# Patient Record
Sex: FEMALE | Race: WHITE | HISPANIC OR LATINO | Employment: UNEMPLOYED | URBAN - METROPOLITAN AREA
[De-identification: names, ages, dates, MRNs, and addresses within clinical notes are randomized per-mention and may not be internally consistent; named-entity substitution may affect disease eponyms.]

---

## 2021-08-12 ENCOUNTER — APPOINTMENT (OUTPATIENT)
Dept: CT IMAGING | Facility: HOSPITAL | Age: 56
End: 2021-08-12

## 2021-08-12 ENCOUNTER — HOSPITAL ENCOUNTER (EMERGENCY)
Facility: HOSPITAL | Age: 56
Discharge: HOME | End: 2021-08-13
Attending: EMERGENCY MEDICINE | Admitting: EMERGENCY MEDICINE

## 2021-08-12 DIAGNOSIS — D49.0 PAROTID TUMOR: Primary | ICD-10-CM

## 2021-08-12 LAB
ANION GAP SERPL CALCULATED.3IONS-SCNC: 9.4 MMOL/L (ref 5–15)
BASOPHILS # BLD AUTO: 0.04 10*3/MM3 (ref 0–0.2)
BASOPHILS NFR BLD AUTO: 0.4 % (ref 0–1.5)
BUN SERPL-MCNC: 8 MG/DL (ref 6–20)
BUN/CREAT SERPL: 11.3 (ref 7–25)
CALCIUM SPEC-SCNC: 8.8 MG/DL (ref 8.6–10.5)
CHLORIDE SERPL-SCNC: 97 MMOL/L (ref 98–107)
CO2 SERPL-SCNC: 24.6 MMOL/L (ref 22–29)
CREAT SERPL-MCNC: 0.71 MG/DL (ref 0.57–1)
DEPRECATED RDW RBC AUTO: 40.8 FL (ref 37–54)
EOSINOPHIL # BLD AUTO: 0.02 10*3/MM3 (ref 0–0.4)
EOSINOPHIL NFR BLD AUTO: 0.2 % (ref 0.3–6.2)
ERYTHROCYTE [DISTWIDTH] IN BLOOD BY AUTOMATED COUNT: 12.9 % (ref 12.3–15.4)
GFR SERPL CREATININE-BSD FRML MDRD: 85 ML/MIN/1.73
GLUCOSE SERPL-MCNC: 125 MG/DL (ref 65–99)
HCT VFR BLD AUTO: 38.5 % (ref 34–46.6)
HGB BLD-MCNC: 11.9 G/DL (ref 12–15.9)
IMM GRANULOCYTES # BLD AUTO: 0.03 10*3/MM3 (ref 0–0.05)
IMM GRANULOCYTES NFR BLD AUTO: 0.3 % (ref 0–0.5)
LYMPHOCYTES # BLD AUTO: 1.92 10*3/MM3 (ref 0.7–3.1)
LYMPHOCYTES NFR BLD AUTO: 17.3 % (ref 19.6–45.3)
MCH RBC QN AUTO: 26.9 PG (ref 26.6–33)
MCHC RBC AUTO-ENTMCNC: 30.9 G/DL (ref 31.5–35.7)
MCV RBC AUTO: 87.1 FL (ref 79–97)
MONOCYTES # BLD AUTO: 1.24 10*3/MM3 (ref 0.1–0.9)
MONOCYTES NFR BLD AUTO: 11.2 % (ref 5–12)
NEUTROPHILS NFR BLD AUTO: 7.83 10*3/MM3 (ref 1.7–7)
NEUTROPHILS NFR BLD AUTO: 70.6 % (ref 42.7–76)
NRBC BLD AUTO-RTO: 0 /100 WBC (ref 0–0.2)
PLATELET # BLD AUTO: 335 10*3/MM3 (ref 140–450)
PMV BLD AUTO: 10.5 FL (ref 6–12)
POTASSIUM SERPL-SCNC: 4 MMOL/L (ref 3.5–5.2)
RBC # BLD AUTO: 4.42 10*6/MM3 (ref 3.77–5.28)
SODIUM SERPL-SCNC: 131 MMOL/L (ref 136–145)
WBC # BLD AUTO: 11.08 10*3/MM3 (ref 3.4–10.8)

## 2021-08-12 PROCEDURE — 99283 EMERGENCY DEPT VISIT LOW MDM: CPT

## 2021-08-12 PROCEDURE — 70491 CT SOFT TISSUE NECK W/DYE: CPT

## 2021-08-12 PROCEDURE — 99283 EMERGENCY DEPT VISIT LOW MDM: CPT | Performed by: EMERGENCY MEDICINE

## 2021-08-12 PROCEDURE — 85025 COMPLETE CBC W/AUTO DIFF WBC: CPT | Performed by: EMERGENCY MEDICINE

## 2021-08-12 PROCEDURE — 80048 BASIC METABOLIC PNL TOTAL CA: CPT | Performed by: EMERGENCY MEDICINE

## 2021-08-12 RX ORDER — PHENOL 1.4 %
600 AEROSOL, SPRAY (ML) MUCOUS MEMBRANE DAILY
COMMUNITY

## 2021-08-12 RX ORDER — FOLIC ACID 1 MG/1
1 TABLET ORAL DAILY
COMMUNITY

## 2021-08-12 RX ORDER — SODIUM CHLORIDE 0.9 % (FLUSH) 0.9 %
10 SYRINGE (ML) INJECTION AS NEEDED
Status: DISCONTINUED | OUTPATIENT
Start: 2021-08-12 | End: 2021-08-13 | Stop reason: HOSPADM

## 2021-08-12 RX ORDER — ACETAMINOPHEN 500 MG
1000 TABLET ORAL ONCE
Status: COMPLETED | OUTPATIENT
Start: 2021-08-12 | End: 2021-08-12

## 2021-08-12 RX ADMIN — ACETAMINOPHEN 1000 MG: 500 TABLET, FILM COATED ORAL at 23:20

## 2021-08-13 VITALS
DIASTOLIC BLOOD PRESSURE: 66 MMHG | HEART RATE: 66 BPM | HEIGHT: 63 IN | SYSTOLIC BLOOD PRESSURE: 112 MMHG | RESPIRATION RATE: 16 BRPM | WEIGHT: 148.5 LBS | TEMPERATURE: 99.7 F | OXYGEN SATURATION: 99 % | BODY MASS INDEX: 26.31 KG/M2

## 2021-08-13 PROCEDURE — 0 IOPAMIDOL PER 1 ML: Performed by: EMERGENCY MEDICINE

## 2021-08-13 RX ADMIN — IOPAMIDOL 100 ML: 755 INJECTION, SOLUTION INTRAVENOUS at 00:14

## 2021-08-13 NOTE — DISCHARGE INSTRUCTIONS
Your CT was concerning for parotid cancer.  Follow-up with Dr. Quinton Recio as above.  Call the clinic at 9: 00 a.m. today to arrange follow-up.  Inform them of ER visit, CT concerning for cancer and my consultation with Dr. Jeffries.  Continue antibiotics.  Take the CD with you to the follow-up appointment.  Tylenol as needed for fever or pain.  Return to ED for worsening symptoms, difficulty swallowing, difficulty breathing, changes in voice, medical emergencies.

## 2021-08-13 NOTE — ED PROVIDER NOTES
Subjective   Tiffanie Gallagher is a 56-year-old  female who presents secondary to right ear pain x2 weeks.  Patient has been seen by otolaryngologist.  She was placed on antibiotics but she has not had any improvement.  Patient's ENT initially told her that he might perform surgery.  However at yesterday's visit patient was told she did not need surgery.  Her symptoms continued today.  Thus she came to the emergency room for evaluation.      History provided by:  Patient   used: Yes ([facility])        Review of Systems   Constitutional: Positive for fever.   HENT: Positive for ear pain and facial swelling. Negative for rhinorrhea.    Eyes: Negative.  Negative for redness.   Respiratory: Negative for cough.    Cardiovascular: Negative for chest pain.   Gastrointestinal: Negative for abdominal pain.   Endocrine: Negative.    Genitourinary: Negative.  Negative for difficulty urinating.   Musculoskeletal: Negative.  Negative for back pain.   Skin: Negative.  Negative for color change.   Neurological: Negative.  Negative for syncope.   Hematological: Negative.    Psychiatric/Behavioral: Negative.    All other systems reviewed and are negative.      Past Medical History:   Diagnosis Date   • Arthritis        No Known Allergies    History reviewed. No pertinent surgical history.    History reviewed. No pertinent family history.    Social History     Socioeconomic History   • Marital status:      Spouse name: Not on file   • Number of children: Not on file   • Years of education: Not on file   • Highest education level: Not on file   Tobacco Use   • Smoking status: Never Smoker   Substance and Sexual Activity   • Alcohol use: Not Currently   • Drug use: Never           Objective   Physical Exam  Vitals and nursing note reviewed.   Constitutional:       General: She is not in acute distress.     Appearance: Normal appearance. She is well-developed. She is not ill-appearing,  toxic-appearing or diaphoretic.   HENT:      Head: Normocephalic and atraumatic.      Right Ear: Ear canal and external ear normal. Tenderness present. No swelling. No mastoid tenderness. Tympanic membrane is erythematous.      Left Ear: Tympanic membrane, ear canal and external ear normal.      Ears:        Nose: Nose normal.      Mouth/Throat:      Mouth: Mucous membranes are moist.      Pharynx: Oropharynx is clear.   Eyes:      Extraocular Movements: Extraocular movements intact.      Conjunctiva/sclera: Conjunctivae normal.      Pupils: Pupils are equal, round, and reactive to light.   Neck:      Trachea: Trachea and phonation normal.     Cardiovascular:      Rate and Rhythm: Normal rate and regular rhythm.      Pulses: Normal pulses.      Heart sounds: Normal heart sounds. No murmur heard.   No friction rub. No gallop.    Pulmonary:      Effort: Pulmonary effort is normal.      Breath sounds: Normal breath sounds.   Abdominal:      General: Bowel sounds are normal. There is no distension.      Palpations: Abdomen is soft. There is no mass.      Tenderness: There is no abdominal tenderness. There is no guarding or rebound.      Hernia: No hernia is present.   Musculoskeletal:         General: Normal range of motion.      Cervical back: Normal range of motion and neck supple. No rigidity or crepitus. No pain with movement or muscular tenderness.   Skin:     General: Skin is warm and dry.      Capillary Refill: Capillary refill takes less than 2 seconds.   Neurological:      General: No focal deficit present.      Mental Status: She is alert and oriented to person, place, and time.      Deep Tendon Reflexes: Reflexes are normal and symmetric.   Psychiatric:         Mood and Affect: Mood normal.         Behavior: Behavior normal.         Procedures           ED Course  ED Course as of Aug 13 0230   Thu Aug 12, 2021   0890 Patient has notable soft tissue swelling in the right parotid gland.  Right tympanic membrane  is erythematous as is external auditory canal.  Obtaining baseline labs and a CT of the neck with IV contrast.  Giving patient Tylenol for fever.    [SS]   Fri Aug 13, 2021   0039 WBC(!): 11.08 [SS]   0039 Neutrophils Absolute(!): 7.83 [SS]   0039 Sodium(!): 131 [SS]   0039 Chloride(!): 97 [SS]   0039 Glucose(!): 125 [SS]   0039 CBC shows mild leukocytosis with white count 11.08K.  Absolute neutrophil count mildly elevated 11.83K.  BMP notable for hyponatremia with sodium 131.  Chloride 97.  Blood sugar mildly elevated at 125.    [SS]   0155 I discussed this case at length with Dr. Roni Jeffries-ENT at [facility].  He reviewed the CT.  As patient does not have any respiratory difficulties, difficulty swallowing, voice changes he does not feel patient needs to be transferred to [facility].  However we are both concerned that this is a malignancy.  He will arrange for patient to be seen in the outpatient clinic by Dr. Quinton Recio this coming week.  I have discussed all of this information at length with the patient via [place] in interpreters.  Patient knowledges understanding.  Will DC home.  Patient is taking antibiotics which I recommend she continue.  Tylenol improved her pain as well as her fever.    [SS]      ED Course User Index  [SS] Hernan Faith MD      Labs Reviewed   BASIC METABOLIC PANEL - Abnormal; Notable for the following components:       Result Value    Glucose 125 (*)     Sodium 131 (*)     Chloride 97 (*)     All other components within normal limits    Narrative:     GFR Normal >60  Chronic Kidney Disease <60  Kidney Failure <15     CBC WITH AUTO DIFFERENTIAL - Abnormal; Notable for the following components:    WBC 11.08 (*)     Hemoglobin 11.9 (*)     MCHC 30.9 (*)     Lymphocyte % 17.3 (*)     Eosinophil % 0.2 (*)     Neutrophils, Absolute 7.83 (*)     Monocytes, Absolute 1.24 (*)     All other components within normal limits   CBC AND DIFFERENTIAL    Narrative:      The following orders were created for panel order CBC & Differential.  Procedure                               Abnormality         Status                     ---------                               -----------         ------                     CBC Auto Differential[[id number]]        Abnormal            Final result                 Please view results for these tests on the individual orders.     CT Soft Tissue Neck With Contrast    Result Date: 8/13/2021  Narrative: CT Neck Soft Tissue W HISTORY: Earache with swelling on the right side for 4 weeks. Parotid region mass. TECHNIQUE: CT of the neck, soft tissues with IV contrast. Coronal and sagittal reconstructions were obtained. Radiation dose reduction techniques included automated exposure control or exposure modulation based on body size. Count of known CT and cardiac nuc med studies performed in previous 12 months: 0. COMPARISON: None available. FINDINGS: Exam is abnormal. There is a low-density somewhat tubular appearing lesion extending from the deep portion of the right parotid gland medially to involve the torus tubarius with obliteration of the fossa of Rosenmuller. There is extension into the right parapharyngeal space with obliteration of the fat planes. There is local mass effect in the posterior nasopharynx. This lesion measures up to 1.8 cm in AP dimension by at least 5.4 cm in ML dimension by as much as 3.2 cm in CC dimension. This is certainly concerning for a malignant mass. Abscess is in the differential diagnosis, but it does not contain any gas bubbles. Soft tissue changes in the deep portion of the right parietal gland could all reflect tumor. There is some narrowing of the airway in the posterior nasopharynx. This likely involves the right palatine tonsil to some degree. ENT consultation is recommended. There is generalized atrophy of the salivary glands overall. Floor of the mouth is normal. There are tiny bilateral hypodense nodules in the  thyroid gland which are likely tiny colloid cysts. The larynx and epiglottis are normal. There are multiple reactive appearing bilateral cervical lymph nodes. No bulky adenopathy is seen. The largest cervical node on the right side adjacent to the carotid bifurcation measures up to 7 mm short axis. Visualized portions of the lungs are clear. Mastoid air cells and middle ear cavities on both sides are clear. Prevertebral soft tissues are normal. Paranasal sinuses are clear.     Impression: 1. Large lesion involving the deep portion of the right parotid gland extending medially to involve the torus tubarius and adjacent soft tissues. This is highly suspicious for a large malignant mass. Abscess is considered less likely. See description above. ENT consultation is recommended. 2. Small bilateral cervical lymph nodes with no large bulky adenopathy identified. 3. Mastoid air cells and middle ear cavities are clear. Signer Name: Richmond Ceballos MD  Signed: 8/13/2021 12:37 AM  Workstation Name: [facility]  [facility]    My differential diagnosis for earache includes but is not limited to otitis media, otitis externa, perforation of the eardrum, foreign body, tinnitus, Ménière's disease, Joe Hunt syndrome, intracranial mass, mastoiditis, parotitis, parotid gland mass, toothache and tooth decay                                           MDM    Final diagnoses:   Parotid tumor       ED Disposition  ED Disposition     ED Disposition Condition Comment    Discharge Stable           Humberto Recio MD  [address] [place] [address]  [phone]    Call   9:00 AM.  Inform staff of ER visit, concerns of cancer, my consultation with Dr. Jeffries and need for follow-up with Dr. Recio this coming week.         Medication List      No changes were made to your prescriptions during this visit.          Hernan Faith MD  08/13/21 [id number]